# Patient Record
(demographics unavailable — no encounter records)

---
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
## 2025-01-17 NOTE — CONSULT LETTER
[Dear  ___] : Dear  [unfilled], [Courtesy Letter:] : I had the pleasure of seeing your patient, [unfilled], in my office today. [Please see my note below.] : Please see my note below. [Consult Closing:] : Thank you very much for allowing me to participate in the care of this patient.  If you have any questions, please do not hesitate to contact me. [Sincerely,] : Sincerely, [FreeTextEntry3] : Venkatesh Lerma MD

## 2025-01-17 NOTE — PHYSICAL EXAM
[FreeTextEntry1] : General: this is a pleasant patient in no acute distress   HEENT conjunctiva are normal, + L eye eccymosis   Mental status: Alert and oriented to person, place and time Memory: registers 5/5, recalls 3/5 with hints 5/5 Attention: $5-$1.35 = 1.65 (wrong), world backwards ok Language is normal    Head: Palpation of cranium and jaw: ok Occipital nerve tenderness: ok   Cervical Spine: Palpation: ok Lateral Rotation: ok Lateral Flexion ok Flexion + rotation: ok, mildly tight   Eye movements: extra-occular movements in tact without nystagmus Saccades: ok Smooth Pursuit: ok Visually enhanced VOR: ok Near point of convergence: 12cm   Other cranial nerves: pupils equal, round and reactive to light. Face symmetric and facial strength symmetric, facial sensation symmetric, hearing present bilaterally to finger rub, tongue and uvula midline. neck flexion and extension normal strength.   Motor: normal bulk and tone throughout. no abnormal movements.  Full 5/5 strength uppers and lower extremities proximally and distally   Sensory: in tact and symmetric to vibration, light tough, pin prick, temperature   Cerebellar: normal finger-nose-finger bilaterally   Reflexes: 2+ in the upper and lower extremities and symmetric.  toes are bilaterally downgoing.   Gait: stable, able to tip toe heel and tandem   Stances: Romberg: sway Sharpened Romberg: sway, steps 18s Single leg, eyes closed: sway steps 5s

## 2025-01-17 NOTE — DATA REVIEWED
[de-identified] : review of her HRV + HR, her normal resting HR is in the 50s but immediately after in the 2 days she had baseline higher, and lower baseline HRV indicating stress response

## 2025-02-20 NOTE — HISTORY OF PRESENT ILLNESS
[FreeTextEntry1] : MICHELLE ALEXANDER is a 30 year who returns to follow up for mTBI  last visit was 1/17/2025  since last visit a lot better but still emotionally dysregulated. signed up for psychotherapy.   denies headaches. still some phonophobia but not photophobia. frequent ringing waxes and wanes during the day.  can be either ear. not very intense from loud noise.   doing PT twice weekly and doing some brain games there also.   aerobic exercise going really well. still high fatigue at night.   she has been doing early intervention for work.

## 2025-02-20 NOTE — PHYSICAL EXAM
[FreeTextEntry1] : General: this is a pleasant patient in no acute distress  HEENT conjunctiva are normal  Mental status: Alert and oriented to person, place and time Attention: world backwards ok Language is normal  Head: Palpation of cranium and jaw: ok Occipital nerve tenderness: ok  Cervical Spine: Palpation: ok Lateral Rotation: ok Lateral Flexion ok Flexion + rotation: ok, mildly tight  Eye movements: extra-occular movements in tact without nystagmus Saccades: ok Smooth Pursuit: ok Visually enhanced VOR: ok Near point of convergence: 12cm  Other cranial nerves: pupils equal, round and reactive to light. Face symmetric and facial strength symmetric, facial sensation symmetric, hearing present bilaterally to finger rub, tongue and uvula midline. neck flexion and extension normal strength.  Motor: normal bulk and tone throughout. no abnormal movements. Full 5/5 strength uppers and lower extremities proximally and distally  Sensory: in tact and symmetric to vibration, light tough, pin prick, temperature  Cerebellar: normal finger-nose-finger bilaterally  Reflexes: 2+ in the upper and lower extremities and symmetric. toes are bilaterally downgoing.  Gait: stable, able to tip toe heel and tandem  Stances: Romberg: sway Sharpened Romberg: stable Single leg, eyes closed: sway but doesn't steps.

## 2025-06-20 NOTE — PHYSICAL EXAM
[FreeTextEntry1] : General: this is a pleasant patient in no acute distress  HEENT conjunctiva are normal  Mental status: Alert and oriented to person, place and time Language is normal  Head: Palpation of cranium and jaw: no tenderness Occipital nerve tenderness: none  Cervical Spine: Palpation: no tenderness Lateral Rotation: full ROM Lateral Flexion: full ROM  Flexion + rotation: full ROM  Eye movements: extra-occular movements in tact without nystagmus  Other cranial nerves: pupils equal, round and reactive to light. Face symmetric and facial strength symmetric, facial sensation symmetric, hearing present bilaterally to finger rub, tongue and uvula midline. neck flexion and extension normal strength.  Motor: normal bulk and tone throughout. no abnormal movements. Full 5/5 strength uppers and lower extremities proximally and distally  Sensory: in tact and symmetric to vibration, light tough, pin prick, temperature  Cerebellar: normal finger-nose-finger bilaterally  Reflexes: 2+ in the upper and lower extremities and symmetric. toes are bilaterally downgoing.  Gait: stable, able to tip toe heel and tandem  Stances: Romberg: stable Sharpened Romberg: stable Single leg, eyes closed: mild sway
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
